# Patient Record
Sex: FEMALE | Race: WHITE | URBAN - METROPOLITAN AREA
[De-identification: names, ages, dates, MRNs, and addresses within clinical notes are randomized per-mention and may not be internally consistent; named-entity substitution may affect disease eponyms.]

---

## 2017-01-01 ENCOUNTER — TELEPHONE (OUTPATIENT)
Dept: FAMILY MEDICINE | Facility: CLINIC | Age: 82
End: 2017-01-01

## 2017-01-01 ENCOUNTER — MEDICAL CORRESPONDENCE (OUTPATIENT)
Dept: HEALTH INFORMATION MANAGEMENT | Facility: CLINIC | Age: 82
End: 2017-01-01

## 2017-01-01 ENCOUNTER — OFFICE VISIT (OUTPATIENT)
Dept: FAMILY MEDICINE | Facility: CLINIC | Age: 82
End: 2017-01-01
Payer: MEDICARE

## 2017-01-01 ENCOUNTER — TELEPHONE (OUTPATIENT)
Dept: FAMILY MEDICINE | Facility: CLINIC | Age: 82
End: 2017-01-01
Payer: MEDICARE

## 2017-01-01 ENCOUNTER — MYC MEDICAL ADVICE (OUTPATIENT)
Dept: FAMILY MEDICINE | Facility: CLINIC | Age: 82
End: 2017-01-01

## 2017-01-01 VITALS
OXYGEN SATURATION: 96 % | DIASTOLIC BLOOD PRESSURE: 70 MMHG | SYSTOLIC BLOOD PRESSURE: 126 MMHG | HEIGHT: 57 IN | RESPIRATION RATE: 28 BRPM | HEART RATE: 70 BPM | TEMPERATURE: 97.6 F

## 2017-01-01 DIAGNOSIS — S81.801D OPEN WOUND OF LOWER LIMB, RIGHT, SUBSEQUENT ENCOUNTER: ICD-10-CM

## 2017-01-01 DIAGNOSIS — S22.000D COMPRESSION FRACTURE OF THORACIC VERTEBRA, WITH ROUTINE HEALING, SUBSEQUENT ENCOUNTER: Primary | ICD-10-CM

## 2017-01-01 DIAGNOSIS — Z71.89 ADVANCED DIRECTIVES, COUNSELING/DISCUSSION: ICD-10-CM

## 2017-01-01 DIAGNOSIS — R94.6 NONSPECIFIC ABNORMAL RESULTS OF THYROID FUNCTION STUDY: ICD-10-CM

## 2017-01-01 LAB
T4 FREE SERPL-MCNC: 1.39 NG/DL (ref 0.76–1.46)
TSH SERPL DL<=0.005 MIU/L-ACNC: 4.59 MU/L (ref 0.4–4)

## 2017-01-01 PROCEDURE — 99214 OFFICE O/P EST MOD 30 MIN: CPT | Performed by: FAMILY MEDICINE

## 2017-01-01 PROCEDURE — 84439 ASSAY OF FREE THYROXINE: CPT | Performed by: FAMILY MEDICINE

## 2017-01-01 PROCEDURE — G0180 MD CERTIFICATION HHA PATIENT: HCPCS | Performed by: FAMILY MEDICINE

## 2017-01-01 PROCEDURE — 36415 COLL VENOUS BLD VENIPUNCTURE: CPT | Performed by: FAMILY MEDICINE

## 2017-01-01 PROCEDURE — G0179 MD RECERTIFICATION HHA PT: HCPCS | Performed by: FAMILY MEDICINE

## 2017-01-01 PROCEDURE — 84443 ASSAY THYROID STIM HORMONE: CPT | Performed by: FAMILY MEDICINE

## 2017-01-01 RX ORDER — SENNOSIDES A AND B 8.6 MG/1
1 TABLET, FILM COATED ORAL DAILY
COMMUNITY
Start: 2017-01-01

## 2017-01-01 RX ORDER — BISACODYL 10 MG
10 SUPPOSITORY, RECTAL RECTAL DAILY PRN
COMMUNITY
Start: 2017-01-01

## 2017-01-01 RX ORDER — ACETAMINOPHEN 650 MG/1
650 SUPPOSITORY RECTAL EVERY 4 HOURS PRN
COMMUNITY
Start: 2017-01-01

## 2017-01-01 RX ORDER — MULTIPLE VITAMINS W/ MINERALS TAB 9MG-400MCG
1 TAB ORAL DAILY
COMMUNITY
Start: 2017-01-01 | End: 2017-01-01

## 2017-01-01 RX ORDER — TRAMADOL HYDROCHLORIDE 50 MG/1
50 TABLET ORAL EVERY 6 HOURS PRN
COMMUNITY
Start: 2017-01-01

## 2017-01-01 RX ORDER — HALOPERIDOL 0.5 MG/1
0.5 TABLET ORAL 4 TIMES DAILY
COMMUNITY
Start: 2017-01-01

## 2017-01-01 RX ORDER — LORAZEPAM 0.5 MG/1
0.25-0.5 TABLET ORAL EVERY 4 HOURS PRN
COMMUNITY
Start: 2017-01-01

## 2017-01-01 ASSESSMENT — PAIN SCALES - GENERAL: PAINLEVEL: SEVERE PAIN (7)

## 2017-01-02 NOTE — TELEPHONE ENCOUNTER
Forms received from [facility] for Bethany Ann MD.  Forms placed in provider 'sign me' folder.  Please fax forms to [phone] after completion.    Farheen Ordonez  Patient Representative

## 2017-01-03 NOTE — PROGRESS NOTES
SUBJECTIVE:                                                    Evette Bates is a 89 year old female who presents to clinic today for the following health issues:      Son says that back pain has improved.  Last time she took tramadol was 48 hours ago. Pain is bad today patient states, she thinks from all the moving around.    Breathing: No improvement with breathing. Patient states that she was taking a barley powder supplement that she thinks was helping to improve her breathing problems.  She stopped taking the Barley Life due to the pain medications she has been taking.     She has not been eating so well but it has been improving. Son thinks due to her schedule. Son son states that the home health visits have created some anxiety for her.    Unintentional Weight loss  Wasn't eating as much because she was thrown off her schedule due to home care visits.  Although this was unclear to both her son and I at the time of the last visit.  Patient stated she was eating (she didn't want people unnecessarily reminding her, but she was in fact not eating well.)    Home Care   Patient hasn't been eating well or breathing well  Hasn't seen  yet  Patient's son wants to know what are the alternatives going forward like hospice options  Met with Desiree home care nurse Friday    Has long term care insurance, which will cover some home care  Has life line set up, but patient took it off because it was bruising her   Liked the people that visited her, but not the schedule of them coming  Believes her kitchen is ideal for what she has to do - arranged nicely for her to take care of it and the counters are low enough for her     Pain   Hasn't been complaining about pain as much as breathlessness   Hasn't had tramadol in 48 hours  First time she felt pain was today in clinic    Breathlessness   Seems to son that when she had pain, breathlessness was not an issue and now that pain is improving breathlessness is  returning.  Tries to lie down at any opportunity, sits down after going from room to room and is panting  Finds it difficult to take deep breaths    She started taking barley supplement for breathlessness in past, had stopped because she was taking other medications and wants to restart  Believes she didn't get as much exercise this summer so she might be weaker as a result      Right lower leg abrasion  Desiree says bandage needs to be changed every few days by family  Has been oozing       Problem list and histories reviewed & adjusted, as indicated.  Additional history: as documented    Patient Active Problem List   Diagnosis     Other osteoporosis     Macular degeneration (senile) of retina     Nonspecific abnormal results of thyroid function study     Muscle pain     Rotator cuff syndrome     CARDIOVASCULAR SCREENING; LDL GOAL LESS THAN 130     Hypertension goal BP (blood pressure) < 140/90     Advanced directives, counseling/discussion     Anemia, iron deficiency     Iron deficiency anemia due to chronic blood loss     DDD (degenerative disc disease), lumbar     Low back pain     Past Surgical History   Procedure Laterality Date     C nonspecific procedure       R HIP FX     Hysterectomy, pap no longer indicated       ovaries intact, fibroids       Social History   Substance Use Topics     Smoking status: Never Smoker      Smokeless tobacco: Never Used     Alcohol Use: No     Family History   Problem Relation Age of Onset     Asthma No family hx of      DIABETES No family hx of      LUNG DISEASE Father       of TB while pt was in high school     CEREBROVASCULAR DISEASE No family hx of      Breast Cancer No family hx of      C.A.D. Mother      Hypertension Mother      Cancer - colorectal Brother          Current Outpatient Prescriptions   Medication Sig Dispense Refill     Acetaminophen (TYLENOL PO) Take 325 mg by mouth every 6 hours as needed for mild pain or fever       traMADol (ULTRAM) 50 MG tablet Take 1  "tablet (50 mg) by mouth every 6 hours as needed for pain maximum 4 tablet(s) per day 60 tablet 0     calcitonin, salmon, (MIACALCIN) 200 UNIT/ACT nasal spray Spray 1 spray into one nostril alternating nostrils daily for 14 days Alternate nostril each day. 1.3 mL 0     lisinopril (PRINIVIL,ZESTRIL) 5 MG tablet Take 1 tablet (5 mg) by mouth daily 90 tablet 1     ferrous sulfate (IRON) 325 (65 FE) MG tablet Take 1 tablet (325 mg) by mouth daily (with breakfast) 90 tablet 1     albuterol (PROAIR HFA, PROVENTIL HFA, VENTOLIN HFA) 108 (90 BASE) MCG/ACT inhaler Inhale 2 puffs into the lungs every 6 hours as needed for shortness of breath / dyspnea or wheezing 1 Inhaler 3     ascorbic acid (VITAMIN C) 500 MG tablet Take 1 tablet (500 mg) by mouth daily 30 tablet 0     LUTEIN OR Once daily.       ASPIRIN 81 MG OR TABS ONE DAILY 100 3     Allergies   Allergen Reactions     Nkda [No Known Drug Allergies]      Problem list, Medication list, Allergies, and Medical/Social/Surgical histories [facility] and updated as appropriate.    ROS:  Constitutional, neuro, ENT, endocrine, pulmonary, cardiac, gastrointestinal, genitourinary, musculoskeletal, integument and psychiatric systems are negative, except as otherwise noted.    This document serves as a record of the services and decisions personally performed and made by Bethany Otero. It was created on her behalf by Vee Rodriguez, a trained medical scribe. The creation of this document is based the provider's statements to the medical scribe.  Vee Rodriguez January 3, 2017 11:23 AM        OBJECTIVE:                                                    /70 mmHg  Pulse 70  Temp(Src) 97.6  F (36.4  C) (Tympanic)  Resp 28  Ht 4' 9\" (1.448 m)  Wt   SpO2 96%  There is no weight on file to calculate BMI.   GENERAL APPEARANCE: healthy, alert and no distress - no respiratory distress, is able to speak in full sentences.  SKIN: on right calf area 5 cm in length of " granulation tissue without surrounding erythema with mild serosanguinous drainage., no signs of infection (unchanged from last visit, perhaps a bit improved)  PSYCH: mentation appears normal and affect normal/bright       ASSESSMENT/PLAN:                                                      (S22.000D) Compression fracture of thoracic vertebra, with routine healing, subsequent encounter  (primary encounter diagnosis)  Comment: improving,  tramadol may be depressing patient's appetite, reduced eating likely cause of increased weakness and shortness of breath which are a bigger concern to patient and family than pain, patient and family have opted to decrease tramadol use to promote eating  Plan:  Continue working on increasing food consumption and use tramadol as needed for pain   Complete 12 week course of calcitonin    (R94.6) Nonspecific abnormal results of thyroid function study  Comment:   Plan: TSH WITH FREE T4 REFLEX        Await labs    (S81.801D) Open wound of lower limb, right, subsequent encounter  Comment: seen and noted by me, home care nurse, and family  Plan: will monitor for changes and continue to redress regularly by home care and family    Breathlessness  Clinically stable today. Do not see any evidence of shortness of breath or new breathing problems on exam today. She is able to speak easily in full multi-sentence phrases  This has been a chronic complaint for patient over the past 2+ years.  Has seen pulmonary twice without any specific improvement in symptoms.  Suspect this is now worsened due to deconditioning as well, son agrees.  Offered that they can always check back in with her pulmonologist.    Advanced Care Directives.  Reviewed POLST with patient, which was started by her daughter Rachel who is an oncology RN.  Patient wishes to be DNR/DNI and would like to avoid ER/hospital visits.  Briefly discussed social work and family discussion regarding patient's goals for this last part of her  life and how to help achieve them.  Son clearly understands and is very willing to help with this.      Follow up with Provider - 3 months and prn   There are no Patient Instructions on file for this visit.    The information in this document, created by the medical scribe Vee Rodriguez for me, accurately reflects the services I personally performed and the decisions made by me. I have reviewed and approved this document for accuracy prior to leaving the patient care area.    MD Bethany Garrett MD  [facility]

## 2017-01-03 NOTE — PATIENT INSTRUCTIONS
[facility]   Discharged by : Katie GLEZ Certified Medical Assistant (AAMA)January 3, 2017 11:26 AM    Paper scripts provided to patient : none   If you have any questions regarding to your visit please contact your care team:   [facility] Clinic Hours Telephone Number   Dr. Katie Zhu, RUBI   7am-7pm Monday - Thursday   7am-5pm Fridays  [phone]   (Appointment scheduling available 24/7)   RN Line   [phone] option 2     What options do I have for visits at the clinic other than the traditional office visit?   To expand how we care for you, many of our providers are utilizing electronic visits (e-visits) and telephone visits, when medically appropriate, for interactions with their patients rather than a visit in the clinic. We also offer nurse visits for many medical concerns. Just like any other service, we will bill your insurance company for this type of visit based on time spent on the phone with your provider. Not all insurance companies cover these visits. Please check with your medical insurance if this type of visit is covered. You will be responsible for any charges that are not paid by your insurance.   E-visits via GIS Cloudhart: generally incur a $35.00 fee.   Telephone visits:   Time spent on the phone: *charged based on time that is spent on the phone in increments of 10 minutes. Estimated cost:   5-10 mins $30.00   11-20 mins. $59.00   21-30 mins. $85.00   Use Vello Appt (secure email communication and access to your chart) to send your primary care provider a message or make an appointment. Ask someone on your Team how to sign up for VivaBioCell.   For a Price Quote for your services, please call our Consumer Price Line at [phone].   As always, Thank you for trusting us with your health care needs!        WHERE TO GO FOR CARE?    Clinic    Make an appointment if you:       Are sick (cold, cough, flu, sore throat, earache or in  pain).       Have a small injury (sprain, small cut, burn or broken bone).       Need a physical exam, Pap smear, vaccine or prescription refill.       Have questions about your health or medicines.    To reach us:      Call [phone] ([phone]). Open 24 hours every day. (For counseling services, call [phone].)    Log into Samatoa at [employer]Networks. (Visit [email or web address].[email or web address] to create an account.) Hospital emergency room    An emergency is a serious or life- threatening problem that must be treated right away.    Call 911 or get to the hospital if you have:      Very bad or sudden:            - Chest pain or pressure         - Bleeding         - Head or belly pain         - Dizziness or trouble seeing, walking or                          Speaking      Problems breathing      Blood in your vomit or you are coughing up blood      A major injury (knocked out, loss of a finger or limb, rape, broken bone protruding from skin)    A mental health crisis. (Or call the Mental Health Crisis line at 1-172.732.4405 or Suicide Prevention Hotline at 1-931.923.7396.)    Open 24 hours every day. You don't need an appointment.     Urgent care    Visit urgent care for sickness or small injuries when the clinic is closed. You don't need an appointment. To check hours or find an urgent care near you, visit [email or web address]. Online care    Get online care from [employer] for more than 70 common problems, like colds, allergies and infections. Open 24 hours every day at: [email or web address]   Need help deciding?    For advice about where to be seen, you may call your clinic and ask to speak with a nurse. We're here for you 24 hours every day.         If you are deaf or hard of hearing, please let us know. We provide many free services including sign language interpreters, oral interpreters, TTYs, telephone amplifiers, note takers and written materials.

## 2017-01-03 NOTE — NURSING NOTE
"Chief Complaint   Patient presents with     Back Pain     Breathing Problem       Initial /70 mmHg  Pulse 70  Temp(Src) 97.6  F (36.4  C) (Tympanic)  Resp 28  Ht 4' 9\" (1.448 m)  Wt   SpO2 96% Estimated body mass index is 12.85 kg/(m^2) as calculated from the following:    Height as of this encounter: 4' 9\" (1.448 m).    Weight as of 12/23/16: 59 lb 6.4 oz (26.944 kg).  BP completed using cuff size: cruz GLEZ, Certified Medical Assistant (AAMA)January 3, 2017 10:43 AM      "

## 2017-01-03 NOTE — MR AVS SNAPSHOT
After Visit Summary   1/3/2017    Evette Bates    MRN: [id number]           Patient Information     Date Of Birth          9/15/1923        Visit Information        Provider Department      1/3/2017 10:20 AM Bethany Otero MD [facility]        Today's Diagnoses     Compression fracture of thoracic vertebra, with routine healing, subsequent encounter    -  1     Nonspecific abnormal results of thyroid function study         Open wound of lower limb, right, subsequent encounter           Care Instructions    [facility]   Discharged by : Katie GLEZ Certified Medical Assistant (AAMA)January 3, 2017 11:26 AM    Paper scripts provided to patient : none   If you have any questions regarding to your visit please contact your care team:   Team [facility] Clinic Hours Telephone Number   Dr. Katie Zhu PA-C   7am-7pm Monday - Thursday   7am-5pm Fridays  [phone]   (Appointment scheduling available 24/7)   RN Line   [phone] option 2     What options do I have for visits at the clinic other than the traditional office visit?   To expand how we care for you, many of our providers are utilizing electronic visits (e-visits) and telephone visits, when medically appropriate, for interactions with their patients rather than a visit in the clinic. We also offer nurse visits for many medical concerns. Just like any other service, we will bill your insurance company for this type of visit based on time spent on the phone with your provider. Not all insurance companies cover these visits. Please check with your medical insurance if this type of visit is covered. You will be responsible for any charges that are not paid by your insurance.   E-visits via Bonobos: generally incur a $35.00 fee.   Telephone visits:   Time spent on the phone: *charged based on time that is spent on the phone in increments of 10  minutes. Estimated cost:   5-10 mins $30.00   11-20 mins. $59.00   21-30 mins. $85.00   Use Battery Medics (secure email communication and access to your chart) to send your primary care provider a message or make an appointment. Ask someone on your Team how to sign up for Battery Medics.   For a Price Quote for your services, please call our [employer] Line at [phone].   As always, Thank you for trusting us with your health care needs!        WHERE TO GO FOR CARE?    Clinic    Make an appointment if you:       Are sick (cold, cough, flu, sore throat, earache or in pain).       Have a small injury (sprain, small cut, burn or broken bone).       Need a physical exam, Pap smear, vaccine or prescription refill.       Have questions about your health or medicines.    To reach us:      Call [phone] ([phone]). Open 24 hours every day. (For counseling services, call [phone].)    Log into Battery Medics at Audanika. (Visit [employer]ations to create an account.) Hospital emergency room    An emergency is a serious or life- threatening problem that must be treated right away.    Call [phone] or get to the hospital if you have:      Very bad or sudden:            - Chest pain or pressure         - Bleeding         - Head or belly pain         - Dizziness or trouble seeing, walking or                          Speaking      Problems breathing      Blood in your vomit or you are coughing up blood      A major injury (knocked out, loss of a finger or limb, rape, broken bone protruding from skin)    A mental health crisis. (Or call the Mental Health Crisis line at 1-307.705.6865 or Suicide Prevention Hotline at 1-614.881.4705.)    Open 24 hours every day. You don't need an appointment.     Urgent care    Visit urgent care for sickness or small injuries when the clinic is closed. You don't need an appointment. To check hours or find an urgent care near you, visit [email or web address]. Online care    Get online care  from [facility] for more than 70 common problems, like colds, allergies and infections. Open 24 hours every day at: [email or web address]   Need help deciding?    For advice about where to be seen, you may call your clinic and ask to speak with a nurse. We're here for you 24 hours every day.         If you are deaf or hard of hearing, please let us know. We provide many free services including sign language interpreters, oral interpreters, TTYs, telephone amplifiers, note takers and written materials.               Follow-ups after your visit        Who to contact     If you have questions or need follow up information about today's clinic visit or your schedule please contact [facility] directly at [phone].  Normal or non-critical lab and imaging results will be communicated to you by MyChart, letter or phone within 4 business days after the clinic has received the results. If you do not hear from us within 7 days, please contact the clinic through Mill33hart or phone. If you have a critical or abnormal lab result, we will notify you by phone as soon as possible.  Submit refill requests through [employer] or call your pharmacy and they will forward the refill request to us. Please allow 3 business days for your refill to be completed.          Additional Information About Your Visit        Mill33hart Information     [employer] gives you secure access to your electronic health record. If you see a primary care provider, you can also send messages to your care team and make appointments. If you have questions, please call your primary care clinic.  If you do not have a primary care provider, please call [phone] and they will assist you.        Care EveryWhere ID     This is your Care EveryWhere ID. This could be used by other organizations to access your [place] medical records  [id number]        Your Vitals Were     Pulse Temperature Respirations Height Pulse Oximetry       70 97.6  F  "(36.4  C) (Tympanic) 28 4' 9\" (1.448 m) 96%        Blood Pressure from Last 3 Encounters:   01/03/17 126/70   12/23/16 120/70   12/02/16 122/78    Weight from Last 3 Encounters:   12/23/16 59 lb 6.4 oz (26.944 kg)   12/02/16 69 lb (31.298 kg)   09/19/16 65 lb 6 oz (29.654 kg)              We Performed the Following     TSH WITH FREE T4 REFLEX        Primary Care Provider Office Phone # Fax #    Bethany Otero [phone]       [facility] [address] [address]        Thank you!     Thank you for choosing [facility]  for your care. Our goal is always to provide you with excellent care. Hearing back from our patients is one way we can continue to improve our services. Please take a few minutes to complete the written survey that you may receive in the mail after your visit with us. Thank you!             Your Updated Medication List - Protect others around you: Learn how to safely use, store and throw away your medicines at www.disposemymeds.org.          This list is accurate as of: 1/3/17 11:26 AM.  Always use your most recent med list.                   Brand Name Dispense Instructions for use    albuterol 108 (90 BASE) MCG/ACT Inhaler    PROAIR HFA/PROVENTIL HFA/VENTOLIN HFA    1 Inhaler    Inhale 2 puffs into the lungs every 6 hours as needed for shortness of breath / dyspnea or wheezing       ascorbic acid 500 MG tablet    VITAMIN C    30 tablet    Take 1 tablet (500 mg) by mouth daily       aspirin 81 MG tablet     100    ONE DAILY       calcitonin (salmon) 200 UNIT/ACT nasal spray    MIACALCIN    1.3 mL    Spray 1 spray into one nostril alternating nostrils daily for 14 days Alternate nostril each day.       ferrous sulfate 325 (65 FE) MG tablet    IRON    90 tablet    Take 1 tablet (325 mg) by mouth daily (with breakfast)       lisinopril 5 MG tablet    PRINIVIL/ZESTRIL    90 tablet    Take 1 tablet (5 mg) by mouth daily       " LUTEIN PO      Once daily.       traMADol 50 MG tablet    ULTRAM    60 tablet    Take 1 tablet (50 mg) by mouth every 6 hours as needed for pain maximum 4 tablet(s) per day       TYLENOL PO      Take 325 mg by mouth every 6 hours as needed for mild pain or fever

## 2017-01-06 NOTE — TELEPHONE ENCOUNTER
Reason for Call:  Home Health Care    Desiree with [facility] called regarding (reason for call): Patient declining Home Care    Orders are needed for this patient. N/A    PT: N/A    OT: N/A    Skilled Nursing: N/A    Pt Provider: Dr. Otero    Phone Number Homecare Nurse can be reached at: [phone]    Can we leave a detailed message on this number? YES    Phone number patient can be reached at: Home number on file [phone] (home)    Best Time: Any time    Thank you!  Colette FAM  Patient Representative  [facility]      Call taken on 1/6/2017 at 2:16 PM by Colette You

## 2017-01-12 NOTE — TELEPHONE ENCOUNTER
[facility] received.  Given to More Gonzalez RN for med rec.  Please give to provider for review and signature upon completion.    Fax to: [phone]      Farheen Ordonez  Patient Representative

## 2017-02-21 NOTE — TELEPHONE ENCOUNTER
I believe patient is still using the services of [facility].   And if so, I would like to initiate hospice services.  If I need to place an order, please let me know.    If she is not still under the care of home care, I will then initiate the orders for hospice.

## 2017-02-28 NOTE — TELEPHONE ENCOUNTER
[facility] called Joshua yesterday but he hasn't called her back. She will call him again today & will notify us when they see her FYI.  Aisha Currie RN

## 2017-03-03 NOTE — TELEPHONE ENCOUNTER
Rachel & Joshua are reluctant as his mom is slowly declining in that she sleeps more every day, they wake her at 1pm. Someone is there every day to help her. She is able to complete ADLs.   Visitors are stressful.   They will monitor & keep hospice posted, he says it's a day by day & week by week thing. Informed PCP.  Aisha Currie RN

## 2017-07-20 NOTE — TELEPHONE ENCOUNTER
Will route to provider to advise.  She has had [facility] in the past, would you consider signing another referral or do you want to see her first? Will also route to care coordination.     Isa Child RN

## 2017-07-20 NOTE — TELEPHONE ENCOUNTER
Reason for Call:  Other call back    Detailed comments: Patients sonJoshua is calling to speak with Dr. Otero about home care options for his mother. She has been home for the last 6 months and she is able to walk short distances but cannot bath or dress herself. He feels like she is declining slowly.   Phone Number Patient can be reached at:  [phone]  Best Time: any     Can we leave a detailed message on this number? YES    Call taken on 7/20/2017 at 2:05 PM by Essie Torres

## 2017-07-21 NOTE — TELEPHONE ENCOUNTER
Called and spoke to  Molly, gave verbal OK for requested orders per huddle with Katie Otero MD.  Isa Child RN

## 2017-07-21 NOTE — TELEPHONE ENCOUNTER
Patsy with [facility] Care Intake calling about orders that they received today. Patient will need an updated face to face with provider. She would like clarification as to what services patient needs-if she needs therapy services, it can be billed under Medicare. HHA would be private pay. Patsy can be reached at [phone], ask for Patsy.

## 2017-07-21 NOTE — TELEPHONE ENCOUNTER
Called and spoke to  Joshua. We discussed the services provided by [facility]. He thinks they will be more likely to go with private pay services - home health aid only - vs skilled nursing care or PT/OT. That would not require a face to face because it would not be billed to medicare. His biggest concern is her bathing - states she hasn't had a good bath in a long time because that is uncomfortable for family and friends. He will have a consult with home care and call back only if needed to have Evette come in to see Dr. Otero. FYI to provider, no further action needed at this time.     Isa Child RN

## 2017-07-21 NOTE — TELEPHONE ENCOUNTER
Care Coordination RN received the message.  Orders have been placed by the PCP.  Care Coordination will remain available for future needs.  Provider may place a referral for care coordination as indicated.    Care Coordination to remain available for the patient to contact in the event of future needs. No follow up planned at this time.     Edwar Lunsford, MSN, RN, PHN  [facility] Care Coordinator  [facility]  Phone: [phone]  [email or web address]

## 2017-07-21 NOTE — TELEPHONE ENCOUNTER
I am happy to help.  And I have signed a home care order.  I do agree with initiating services.  Unfortunately I do think medicare rules will require her to come in for a face to face visit with me within 90 days of initiating services.

## 2017-07-28 NOTE — TELEPHONE ENCOUNTER
HC/ Home Health Cert (07/22/17-09/19/17) received.  Given to More Gonzalez RN for med rec.  Please give to provider for review and signature upon completion.    Fax to: [phone]      Farheen Ordonez  Patient Representative

## 2017-09-25 NOTE — TELEPHONE ENCOUNTER
Called Joshua after reviewing Jike Xueyuan message, she had breathing difficulty this morning & has been unable to get out of bed. He was concerned about getting hospice started asap, I stated the referral was entered about an hour ago & I informed the home care RN. I gave him the hospice phone number & he will call & see if they can come out tomorrow.   Aisha Currie RN

## 2017-09-25 NOTE — TELEPHONE ENCOUNTER
Informed RN that hospice was recently ordered. Joshua had called her & she will call him back.   Aisha Currie RN

## 2017-09-25 NOTE — TELEPHONE ENCOUNTER
Reason for Call:  Other Hospice     Detailed comments: Patient's home care nurse called stating that the patient's family wanted to start hospice but hasn't heard anything back from the care team. Please call home care nurse back.    Phone Number Patient can be reached at: Other phone number:  [phone]    Best Time: Anytime    Can we leave a detailed message on this number? YES    Call taken on 9/25/2017 at 1:43 PM by Roxy Lewis

## 2017-09-25 NOTE — TELEPHONE ENCOUNTER
Patient's son, Joshua, calling clinic wanting to talk with an RN about his mom.  Someone who might be familiar with her.  Please call Joshua back at [phone].    Lacey LANGE.  Patient Representative  [place]

## 2017-09-28 NOTE — TELEPHONE ENCOUNTER
Forms received from [facility]/ [facility] Orders for Bethany Ann MD.  Forms placed in provider 'sign me' folder.  Please fax forms to [phone] after completion.    Farheen Ordonez  Patient Representative

## 2017-10-03 NOTE — TELEPHONE ENCOUNTER
Forms received from [facility]/ Hospice Certification of Terminal Illness (benefit period 9/25/17-12/23/17) for Bethany Ann MD.  Forms placed in provider 'sign me' folder.  Please fax forms to [phone] after completion.    Farheen Ordonez  Patient Representative

## 2017-10-05 NOTE — TELEPHONE ENCOUNTER
Hospice med rec received. Cancelled previous med & entered hospice meds. Placed in MD's sign me.  Aisha Currie RN